# Patient Record
Sex: MALE | URBAN - METROPOLITAN AREA
[De-identification: names, ages, dates, MRNs, and addresses within clinical notes are randomized per-mention and may not be internally consistent; named-entity substitution may affect disease eponyms.]

---

## 2018-06-20 ENCOUNTER — EMERGENCY (EMERGENCY)
Facility: HOSPITAL | Age: 28
LOS: 1 days | Discharge: HOME | End: 2018-06-20
Attending: EMERGENCY MEDICINE | Admitting: EMERGENCY MEDICINE
Payer: SELF-PAY

## 2018-06-20 VITALS
HEART RATE: 79 BPM | SYSTOLIC BLOOD PRESSURE: 141 MMHG | OXYGEN SATURATION: 100 % | DIASTOLIC BLOOD PRESSURE: 94 MMHG | RESPIRATION RATE: 17 BRPM | TEMPERATURE: 98 F

## 2018-06-20 PROCEDURE — 99283 EMERGENCY DEPT VISIT LOW MDM: CPT

## 2018-06-20 NOTE — ED PROVIDER NOTE - MEDICAL DECISION MAKING DETAILS
pt p/w chronic abd pain, with 3 recent ct's all which were neg. states was given some abx in the past but stopped them because felt they were making him worse. pt denies worsening of symptoms.

## 2018-06-20 NOTE — ED ADULT TRIAGE NOTE - CHIEF COMPLAINT QUOTE
as per , pt. came in c/o feeling like his throat is closing up about 45mins ago after eating bananas, felt it's getting worse. denies any itchiness nor rashes. pt. c/o pain on his throat and felt something "blocking" in the throat, no dysphagia. pt. speaks clearly and in full sentences. pt.'s respirations unlabored and symmetrical, lung sounds are clear. denies any PMHx nor allergies. Charge RN made aware. as per , pt. came in c/o feeling like his throat is closing up about 45mins ago after eating bananas, felt it's getting worse. denies any itchiness nor rashes. pt. c/o pain on his throat and felt something "blocking" in the throat, no dysphagia nor vomiting. pt. speaks clearly and in full sentences. pt.'s respirations unlabored and symmetrical, lung sounds are clear. denies any PMHx nor allergies. Charge RN made aware.    Adden: Pt. seen by CARINE Barrientos in triage, no stridor and stable at this time as per MD. as per , pt. came in c/o feeling like his throat is closing up about 45mins ago after eating bananas, felt it's getting worse. denies any itchiness nor rashes. pt. c/o pain on his throat and felt something "blocking" in the throat, no dysphagia nor vomiting. pt. speaks clearly and in full sentences. pt.'s respirations unlabored and symmetrical, lung sounds are clear. denies any PMHx nor allergies. Charge RN/ANM Vivek made aware.    Adden: Pt. seen by CARINE Barrientos in triage, no stridor and stable at this time as per MD.

## 2018-06-20 NOTE — ED PROVIDER NOTE - OBJECTIVE STATEMENT
28 y/o with 6 months of rla pain, states has distension intermittent. states had sensation of throat closing today but speaking full sentences, neg cyanosis. has had 3 ct's over last 6 months. with no fever, n/v./d.